# Patient Record
(demographics unavailable — no encounter records)

---

## 2025-05-20 NOTE — REASON FOR VISIT
[New Patient Visit] : a new patient visit [Other: _____] : [unfilled] [FreeTextEntry1] : Right sided neck pain post ACDF

## 2025-05-20 NOTE — CONSULT LETTER
[Dear  ___] : Dear  [unfilled], [Courtesy Letter:] : I had the pleasure of seeing your patient, [unfilled], in my office today. [Sincerely,] : Sincerely, [FreeTextEntry2] : Summer Corea M.D. [address] [place] [id number] [FreeTextEntry1] : This is a 69 year old retired male presenting to our office for a long-term follow-up following a few With performed in 2020.  To recap the patient had presented in 2020 with cervical radiculopathy and cervical stenosis requiring a C3-C6 ACDF with anterior plate that was performed in October 2022 by Dr. Maxwell Hyatt.  The patient had done extremely well postoperatively and had stiff neck residual over the years which was tolerable and managed with Tylenol.  The patient had been well until 1-1/2 months ago when he went to the gym and lifted a 20 pound dumbbell on each arm developing a sudden onset of right and left-sided neck pain.  The pain on the left side has subsided.  However, the pain on his right side of his posterior neck continues and has progressed.  The pain begins in the right posterior side of his neck and radiates into the suboccipital region and across the jaw into the cheek.  The pain is now become constant and is a stabbing-like sensation.  There is some associated numbness.  The patient's symptoms increase when he lifts any object in the right arm.  The patient even experiences pain under his right eye.    The patient denies any cervical radiculopathy symptoms.  The patient denies any arm pain or worsening radicular symptoms.  There is no tingling and numbness down the patient's arm.  The patient does report some achiness in the right upper extremity but this is mild in nature.  The patient denies any coordination issues.  There are no symptoms of the patient's right hand.  The patient denies any imbalance issues.  There are no changes in his bowel or bladder habits or function.  The patient reports that the muscles in his neck anteriorly are also affected with an achiness.  The patient has no trouble swallowing.  The patient continues to have chronic neck stiffness.  The patient has been self-medicating with Tylenol over-the-counter which is self-limiting.  The patient has an underlying history of BPH and prior history of lymphoma which is monitored every year without any recurrence.   There were no new images to review.  However I did review a prior CT scan of the cervical spine with patient from October 2020.  I pointed out the postop view of cervical hardware in an excellent decompression in the canal.  The patient had a good understanding.  On neurologic examination, the patient is alert and oriented.  Appears well and in no distress.  Speech clear and fluent.  CN intact.  The patient is able to shrug shoulders but with a moderate amount of pain.  Full ROM of neck.  No saccades and no nystagmus.  Visual fields full to confrontation.  No ptosis.  Face sensation intact and symmetrical.  Hearing normal.  Tongue is midline and moves in all directions and does not deviate.  No vibration, temperature, or sensory loss throughout and pin prick normal on face bilaterally.  No dysmetria of UE.  Full strength in all muscle groups.  Reflexes are 2+ in all extremities and equal and symmetric throughout.  No Martinez sign bilaterally.    No abnormal extraneous movements.   Gait is steady without any devices.    The patient has a new onset of neck and facial pain directly after lifting 20 pounds.  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  Without any radicular symptoms it is possible that the patient has musculoskeletal pain.  I recommended that the patient begin physical therapy.  He was provided a prescription for 6 to 8 weeks of PT.  The patient will also undergo a brain MRI to rule out any structural lesion.  A CT scan of the cervical spine was ordered to evaluate for hardware failure.  We reviewed certain medications that may help the patient's pain including gabapentin Lyrica or a muscle relaxer.  The patient did defer medications at this time.  The patient will complete his imaging and return to the office and follow-up with Dr. Hyatt.  The patient knows to contact the office he has any concerns.  Thank you for very kindly including me in the evaluation and treatment of your patient.  Please do not hesitate to contact me should you have any concerns or questions regarding the patients neck and right facial pain or proposed follow-up treatment and evaluation plan.  [FreeTextEntry3] :  Kimberly Lombardo, DNP, NP Nurse Practitioner Neurosurgery  [facility] Physician [facility] at [address]  [id number] Assistant  [facility] email: [email or web address] P- [phone] F- [phone]      Kimberly Lombardo, DNP, NP        Nurse Practitioner     Neurosurgery and Spine     [facility] at [address]  [address]     Assistant      [facility]     [facility] Physician Assistant Studies     email: [email or web address] <[email or web address]>     P- [phone]     F- [phone]

## 2025-05-20 NOTE — REVIEW OF SYSTEMS
[Arm Weakness] : no arm weakness [Hand Weakness] : no hand weakness [Leg Weakness] : no leg weakness [Numbness] : numbness [Tingling] : no tingling [Abnormal Sensation] : an abnormal sensation [Lightheadedness] : no lightheadedness [Difficulty Walking] : no difficulty walking [Negative] : Heme/Lymph [de-identified] : right sided neck pain and right facial pain

## 2025-06-02 NOTE — CONSULT LETTER
[Dear  ___] : Dear  [unfilled], [Courtesy Letter:] : I had the pleasure of seeing your patient, [unfilled], in my office today. [Sincerely,] : Sincerely, [FreeTextEntry2] : Summer Corea M.D. [address] [place] [id number] [FreeTextEntry1] :  Subjective:   - Summary: Patient reports neck and shoulder pain after lifting weights, as well as facial pain, jaw pain, and difficulty swallowing. Patient also mentions having a cold.   - Chief Complaint (CC): Neck and shoulder pain, facial pain, and difficulty swallowing   - History of Present Illness (HPI): Patient attempted to resume weightlifting after a long break, starting with 20-pound curls as advised by a physical therapist. During the exercise, he felt a snap in his left shoulder. Subsequently, he experienced pain in his cheek, jaw, head, and right shoulder blade. The pain affected his ability to eat and swallow. After a two-week break, he attempted lifting 15 pounds, which caused the symptoms to return intensely. The patient now experiences pain in both shoulders, with the right side being more affected. He also reports pressure in his face when bending down.   - Past Medical History: History of neck surgery, prostate issues   - Past Surgical History: Previous neck surgery   - Family History:    - Social History: Patient exercises regularly   - Review of Systems: Positive for facial pain, jaw pain, difficulty swallowing, and sinus congestion   - Medications: Generic versions of Hytrin and Flomax, Rapaflo   - Allergies:    Objective:   - Diagnostic Results: Brain scan shows normal brain structure but extensive sinus infection. Neck imaging reveals loosening of hardware at C3 level, with a screw backed out and fractured bone around it.   - Vital Signs:    - Physical Examination (PE): Not explicitly mentioned in the conversation   Assessment:   - Summary: Patient presents with two main issues: 1) Extensive sinus infection affecting nasal passages, maxillary sinuses, and frontal sinuses, causing facial pain and swallowing difficulties. 2) Loosening and fracturing of hardware at C3 level in the neck, likely due to recent weightlifting activity, causing neck and shoulder pain.   - Problems:     - Extensive sinus infection     - Loosening and fracturing of cervical spine hardware     - Neck and shoulder pain     - Difficulty swallowing   - Differential Diagnosis:     - Hardware failure in cervical spine     - Cervical radiculopathy     - Acute sinusitis     - Trigeminal neuralgia   Plan:   - Summary: Treatment plan includes addressing both the sinus infection and the cervical spine issue. For the sinus infection, I will prescribe antibiotics and recommend a nasal spray. For the cervical spine, I plan to discuss the case with the spine specialty group to determine the best surgical approach, likely from the posterior to avoid potential long-term swallowing and breathing issues.   - Plan:     - Prescribe Azithromycin (Z-pack) for sinus infection     - Recommend over-the-counter Flonase nasal spray, twice daily     - Consult with urologist to ensure no contraindications for prescribed medications     - Refer to ENT specialist for follow-up on sinus infection     - Discuss case with spine specialty group to plan surgical approach for cervical spine hardware revision     - Consider posterior approach for cervical spine surgery to minimize risk to swallowing and breathing     - Advise patient to wear cervical collar for support and pain relief     - Schedule follow-up appointment to discuss surgical plan and prepare for procedure     - Educate patient on proper use of nasal spray and potential side effects of medications     - Advise patient to avoid heavy lifting and maintain cautious approach to exercise   [FreeTextEntry3] :     Maxwell Hyatt MD, PhD, FRCPSC        Attending Neurosurgeon  of Neurosurgery [facility]    [address], 2nd [place] [address]     Office: [phone]     Fax: [phone]

## 2025-06-02 NOTE — REASON FOR VISIT
No pertinent past medical history [Follow-Up: _____] : a [unfilled] follow-up visit <<----- Click to add NO pertinent Past Medical History